# Patient Record
Sex: MALE | Race: WHITE | NOT HISPANIC OR LATINO | Employment: UNEMPLOYED | URBAN - METROPOLITAN AREA
[De-identification: names, ages, dates, MRNs, and addresses within clinical notes are randomized per-mention and may not be internally consistent; named-entity substitution may affect disease eponyms.]

---
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
## 2023-04-23 ENCOUNTER — HEALTH MAINTENANCE LETTER (OUTPATIENT)
Age: 23
End: 2023-04-23

## 2024-08-29 NOTE — PROGRESS NOTES
Problem: Adult Inpatient Plan of Care  Goal: Plan of Care Review  Outcome: Progressing  Goal: Patient-Specific Goal (Individualized)  Outcome: Progressing  Goal: Absence of Hospital-Acquired Illness or Injury  Outcome: Progressing  Goal: Optimal Comfort and Wellbeing  Outcome: Progressing     Problem: Fall Injury Risk  Goal: Absence of Fall and Fall-Related Injury  Outcome: Progressing  Intervention: Identify and Manage Contributors  Flowsheets (Taken 8/28/2024 2026)  Self-Care Promotion:   independence encouraged   BADL personal objects within reach   BADL personal routines maintained   adaptive equipment use encouraged  Medication Review/Management: medications reviewed  Intervention: Promote Injury-Free Environment  Flowsheets (Taken 8/28/2024 2026)  Safety Promotion/Fall Prevention:   assistive device/personal item within reach   commode/urinal/bedpan at bedside   bed alarm set   diversional activities provided   instructed to call staff for mobility   lighting adjusted   medications reviewed   muscle strengthening facilitated   nonskid shoes/socks when out of bed   side rails raised x 3        SUBJECTIVE:                                                    Guero Forrest is a 16 year old male, here for a routine health maintenance visit,   accompanied by his mother in [facility]    Patient was roomed by: Sammi Thorpe MA March 16, 201710:13 AM    Do you have any forms to be completed?  no    SOCIAL HISTORY  Family members in house: mother, father and brother  Language(s) spoken at home: English  Recent family changes/social stressors: none noted    SAFETY/HEALTH RISKS  TB exposure:  No  Cardiac risk assessment: none  Do you monitor your child's screen use?  Yes    VISION:  Testing not done; patient has seen eye doctor in the past 12 months.    HEARING:  Testing not done, normal hearing test last year, no current hearing concerns.    DENTAL  Dental health HIGH risk factors: none  Water source:  city water    SPORTS QUESTIONNAIRE:  ======================   School: [employer]                          Grade: 8th                   Sports: unknown/all  1. no - Has a doctor ever denied or restricted your participation in sports for any reason or told you to give up sports?  2. no - Do you have an ongoing medical condition (like diabetes,asthma, anemia, infections)?   3. no - Are you currently taking any prescription or nonprescription (over-the-counter) medicines or pills?    4. no - Do you have allergies to medicines, pollens, foods or stinging insects?    5. no - Have you ever spent the night in a hospital?  6. no - Have you ever had surgery?   7. no - Have you ever passed out or nearly passed out DURING exercise?  8. no - Have you ever passed out or nearly passed out AFTER exercise?  9. no -Have you ever had discomfort, pain, tightness, or pressure in your chest during exercise?  10. no -Does your heart race or skip beats (irregular beats) during exercise?   11. no -Has a doctor ever told you that you have ;high blood pressure, a heart murmur, high cholesterol,a heart infection, Rheumatic  fever, Kawasaki's Disease?  12. no - Has a doctor ever ordered a test for your heart? (example, ECG/EKG, Echocardiogram, stress test)  13. no -Do you ever get lightheaded or feel more short of breath than expected during exercise?   14. no-Have you ever had an unexplained seizure?   15. no - Do you get more tired or short of breath more quickly than your friends during exercise?   16. no - Has any family member or relative  of heart problems or had an unexpected or unexplained sudden death before age 50 (including unexplained drowning, unexplained car accident or sudden infant death syndrome)?  17. no - Does anyone in your family have hypertrophic cardiomyopathy, Marfan Syndrome, arrhythmogenic right ventricular cardiomyopathy, long QT syndrome, short QT syndrome, Brugada syndrome, or catecholaminergic polymorphic ventricular tachycardia?    18. no - Does anyone in your family have a heart problem, pacemaker, or implanted defibrillator?   19. no -Has anyone in your family had unexplained fainting, unexplained seizures, or near drowning?   20. no - Have you ever had an injury, like a sprain, muscle or ligament tear or tendonitis, that caused you to miss a practice or game?   21. no - Have you had any broken or fractured bones, or dislocated joints?   22 no - Have you had an injury that required x-rays, MRI, CT, surgery, injections, therapy, a brace, a cast, or crutches?    23. no - Have you ever had a stress fracture?   24. no - Have you ever been told that you have or have you had an x-ray for neck instability or atlantoaxial instability? (Down syndrome or dwarfism)  25. no - Do you regularly use a brace, orthotics or assistive device?    26. no -Do you have a bone,muscle, or joint injury that bothers you?   27. no- Do any of your joints become painful, swollen, feel warm or look red?   28. no -Do you have any history of juvenile arthritis or connective tissue disease?   29. no - Has a doctor ever told you that  you have asthma or allergies?   30. no - Do you cough, wheeze, have chest tightness, or have difficulty breathing during or after exercise?    31. no - Is there anyone in your family who has asthma?    32. no - Have you ever used an inhaler or taken asthma medicine?   33. no - Do you develop a rash or hives when you exercise?   34. no - Were you born without or are you missing a kidney, an eye, a testicle (males), or any other organ?  35. no- Do you have groin pain or a painful bulge or hernia in the groin area?   36. no - Have you had infectious mononucleosis (mono) within the last month?   37. no - Do you have any rashes, pressure sores, or other skin problems?   38. no - Have you had a herpes or MRSA skin infection?    39. no - Have you ever had a head injury or concussion?   40. no - Have you ever had a hit or blow in the head that caused confusion, prolonged headaches, or memory problems?    41. no - Do you have a history of seizure disorder?    42. no - Do you have headaches with exercise?   43. no - Have you ever had numbness, tingling or weakness in your arms or legs after being hit or falling?   44. no - Have you ever been unable to move your arms or legs after being hit or falling?   45. no -Have you ever become ill while exercising in the heat?  46. no -Do you get frequent muscle cramps when exercising?  47. no - Do you or someone in your family have sickle cell trait or disease?    48. no - Have you had any problems with your eyes or vision?   49. no - Have you had any eye injuries?   50. no - Do you wear glasses or contact lenses?    51. no - Do you wear protective eyewear, such as goggles or a face shield?  52. no- Do you worry about your weight?    53. no - Are you trying to or has anyone recommended that you gain or lose weight?    54. no- Are you on a special diet or do you avoid certain types of foods?  55. no- Have you ever had an eating disorder?   56. no - Do you have any concerns that you would  like to discuss with a doctor?      QUESTIONS/CONCERNS: None    SAFETY  Car seat belt always worn:  Yes  Helmet worn for bicycle/roller blades/skateboard?  No  Guns/firearms in the home: YES, Trigger locks present? YES, Ammunition separate from firearm: YES    ELECTRONIC MEDIA  TV in bedroom: No  < 2 hours/ day    EDUCATION  School:  [employer]  Grade: 8th  School performance / Academic skills: doing well in school  Days of school missed: 5 or fewer  Concerns: no    ACTIVITIES  Do you get at least 60 minutes per day of physical activity, including time in and out of school: Yes  Extra-curricular activities: bike, hang out with Friends  Organized / team sports:  [employer]    DIET  Do you get at least 4 helpings of a fruit or vegetable every day: Yes  How many servings of juice, non-diet soda, punch or sports drinks per day: orange juice  Dairy:  Milk 1-2x/day, no yogurt    SLEEP  No concerns, sleeps well through night    ============================================================    PROBLEM LIST  Patient Active Problem List   Diagnosis     NO ACTIVE PROBLEMS     Clavicle fracture - left     Osgood-Schlatter's disease     MEDICATIONS  Current Outpatient Prescriptions   Medication Sig Dispense Refill     IBUPROFEN         ALLERGY  Allergies   Allergen Reactions     Nkda [No Known Drug Allergies]        IMMUNIZATIONS  Immunization History   Administered Date(s) Administered     DTAP (<7y) 2002, 2002, 03/07/2003, 01/16/2004, 10/13/2006     HIB 2002, 2002, 03/07/2003, 01/16/2004     Hepatitis A Vac Ped/Adol-2 Dose 09/05/2007, 11/12/2008     Hepatitis B 2002, 03/07/2003, 06/06/2003     Human Papilloma Virus 06/16/2014, 01/26/2015     IPV 10/13/2006     Influenza (IIV3) 10/25/2008     MMR 10/24/2003, 10/13/2006     Meningococcal (Menactra ) 06/16/2014     Pneumococcal (PCV 7) 2002, 2002, 03/07/2003, 09/10/2004     Polio Not Indicated-by Titer 2002, 2002,  "06/06/2003     TDAP (BOOSTRIX AGES 10-64) 06/07/2013     Varicella 10/24/2003, 10/13/2006       HEALTH HISTORY SINCE LAST VISIT  No surgery, major illness or injury since last physical exam    DRUGS  Smoking:  no  Passive smoke exposure:  no  Alcohol:  no  Drugs:  no    SEXUALITY  Sexual activity: No    PSYCHO-SOCIAL/DEPRESSION  General screening:  Pediatric Symptom Checklist-Youth PASS (score 6--<30 pass), no followup necessary  No concerns    ROS  GENERAL: See health history, nutrition and daily activities   SKIN: No  rash, hives or significant lesions  HEENT: Hearing/vision: see above.  No eye, nasal, ear symptoms.  RESP: No cough or other concerns  CV: No concerns  GI: See nutrition and elimination.  No concerns.  : See elimination. No concerns  NEURO: No headaches or concerns.    OBJECTIVE:                                                    EXAM  /75  Pulse 102  Temp 97.4  F (36.3  C) (Oral)  Resp 20  Ht 5' 9.29\" (1.76 m)  Wt 147 lb (66.7 kg)  SpO2 100%  BMI 21.53 kg/m2  87 %ile based on CDC 2-20 Years stature-for-age data using vitals from 3/16/2017.  86 %ile based on CDC 2-20 Years weight-for-age data using vitals from 3/16/2017.  75 %ile based on CDC 2-20 Years BMI-for-age data using vitals from 3/16/2017.  Blood pressure percentiles are 83.7 % systolic and 79.7 % diastolic based on NHBPEP's 4th Report.   GENERAL: Active, alert, in no acute distress.  SKIN: Clear. No significant rash, abnormal pigmentation or lesions  HEAD: Normocephalic  EYES: Pupils equal, round, reactive, Extraocular muscles intact. Normal conjunctivae.  EARS: Normal canals. Tympanic membranes are normal; gray and translucent.  NOSE: Normal without discharge.  MOUTH/THROAT: Clear. No oral lesions. Teeth without obvious abnormalities.  NECK: Supple, no masses.  No thyromegaly.  LYMPH NODES: No adenopathy  LUNGS: Clear. No rales, rhonchi, wheezing or retractions  HEART: Regular rhythm. Normal S1/S2. No murmurs. Normal " pulses.  ABDOMEN: Soft, non-tender, not distended, no masses or hepatosplenomegaly. Bowel sounds normal.   NEUROLOGIC: No focal findings. Cranial nerves grossly intact: DTR's normal. Normal gait, strength and tone  BACK: Spine is straight, no scoliosis.  EXTREMITIES: Full range of motion, no deformities  -M: Normal male external genitalia. Duran stage 3,  both testes descended, no hernia.    SPORTS EXAM:        Shoulder:  normal    Elbow:  normal    Hand/Wrist:  normal    Back:  normal    Quad/Ham:  normal    Knee:  normal    Ankle/Feet:  normal    Heel/Toe:  normal    Duck walk:  normal    ASSESSMENT/PLAN:                                                    1. Encounter for routine child health examination w/o abnormal findings    - BEHAVIORAL / EMOTIONAL ASSESSMENT [58077]    Anticipatory Guidance  The following topics were discussed:  SOCIAL/ FAMILY:    Peer pressure    Increased responsibility    Parent/ teen communication    School/ homework  NUTRITION:    Healthy food choices    Calcium  HEALTH/ SAFETY:    Adequate sleep/ exercise    Body image    Seat belts    Swim/ water safety    Sunscreen/ insect repellent  SEXUALITY:    Dating/ relationships    Preventive Care Plan  Immunizations    Reviewed, up to date  Referrals/Ongoing Specialty care: No   See other orders in [facility].  Cleared for sports:  Yes  BMI at 75 %ile based on CDC 2-20 Years BMI-for-age data using vitals from 3/16/2017.  No weight concerns.  Dental visit recommended: Yes    FOLLOW-UP: in 1-2 year for a Preventive Care visit    Resources  HPV and Cancer Prevention:  What Parents Should Know  What Kids Should Know About HPV and Cancer  Goal Tracker: Be More Active  Goal Tracker: Less Screen Time  Goal Tracker: Drink More Water  Goal Tracker: Eat More Fruits and Veggies    Krystal Dominguez PA-C  [facility]

## 2025-06-19 ENCOUNTER — OFFICE VISIT (OUTPATIENT)
Dept: URGENT CARE | Facility: URGENT CARE | Age: 25
End: 2025-06-19
Payer: COMMERCIAL

## 2025-06-19 VITALS
BODY MASS INDEX: 29.68 KG/M2 | SYSTOLIC BLOOD PRESSURE: 122 MMHG | TEMPERATURE: 98.4 F | OXYGEN SATURATION: 99 % | WEIGHT: 212 LBS | HEART RATE: 95 BPM | HEIGHT: 71 IN | DIASTOLIC BLOOD PRESSURE: 80 MMHG | RESPIRATION RATE: 18 BRPM

## 2025-06-19 DIAGNOSIS — R07.0 THROAT PAIN: Primary | ICD-10-CM

## 2025-06-19 DIAGNOSIS — B34.9 VIRAL SYNDROME: ICD-10-CM

## 2025-06-19 LAB
DEPRECATED S PYO AG THROAT QL EIA: NEGATIVE
S PYO DNA THROAT QL NAA+PROBE: NOT DETECTED

## 2025-06-19 NOTE — PROGRESS NOTES
Urgent Care Clinic Visit    Chief Complaint   Patient presents with    Urgent Care    Throat Problem     Patient reports symptoms started a couple of days ago waking up feeling like he was not able to breath states on the past 4 years he has had a couple of episodes of swollen tonsils, throat pain, swollen lymph  nodes did see ENT in [place] and was given steroids but still having episodes                6/19/2025     2:41 PM   Additional Questions   Roomed by ENRIQUE Lr   Accompanied by Self

## 2025-06-19 NOTE — PROGRESS NOTES
Assessment & Plan     Throat pain    - Streptococcus A Rapid Screen w/Reflex to PCR - Clinic Collect  - Group A Streptococcus PCR Throat Swab    Viral syndrome       Reviewed negative rapid strep results during visit, PCR testing in process, will notify if positive. COVID testing declined. Discussed symptoms likely viral in nature and antibiotic not indicated at this time. Recommended rest, fluids, tylenol and as needed, gargle warm salt water, throat lozenges. Go to ED if difficulty breathing or swallowing. Lidocaine rx declined.     Follow-up with PCP if symptoms persist for 7 days, and sooner if symptoms worsen or new symptoms develop.     Discussed red flag symptoms which warrant immediate visit in emergency room    All questions were answered and patient verbalized understanding. AVS sent via [employer] Winston, MICHAEL, APRN, CNP 6/19/2025 2:56 PM  [facility]    Farhad Jack is a 24 year old male who presents to clinic today for the following health issues:  Chief Complaint   Patient presents with    Urgent Care    Throat Problem     Patient reports symptoms started a couple of days ago waking up feeling like he was not able to breath states on the past 4 years he has had a couple of episodes of swollen tonsils, throat pain, swollen lymph  nodes did see ENT in [place] and was given steroids but still having episodes          6/19/2025     2:41 PM   Additional Questions   Roomed by ENRIQUE Lr   Accompanied by Self         Patient presents for evaluation of sore throat. Associated symptoms: headache, mild nasal congestion. Symptoms started 3-4 days ago and have been worsening. Pain is moderate.   Denies fever, cough, emesis, ear pain, rash. Has been Taking Chloraseptic cough drops which helps temporarily. Has been able to drink fluids and swallow without difficulty.     Patient initially states throat feels narrow making breathing difficult and when advised to go to emergency room  "with difficulty breathing he retracts his statement and denies difficulty swallowing or breathing.     He just came back from vacation in [place] 2 days ago on 5 day vacation. He states he gets similar symptoms previously and antibiotic steroids have helped  him previously. Has a history of mono.     Problem list, Medication list, Allergies, and Medical history reviewed in EP[facility].    ROS:  Review of systems negative except for noted above        Objective    /80   Pulse 95   Temp 98.4  F (36.9  C) (Tympanic)   Resp 18   Ht 1.803 m (5' 11\")   Wt 96.2 kg (212 lb)   SpO2 99%   BMI 29.57 kg/m    Physical Exam  Constitutional:       General: He is not in acute distress.     Appearance: He is not toxic-appearing or diaphoretic.   HENT:      Head: Normocephalic and atraumatic.      Right Ear: Tympanic membrane, ear canal and external ear normal.      Left Ear: Tympanic membrane, ear canal and external ear normal.      Nose: Congestion present.      Mouth/Throat:      Mouth: Mucous membranes are moist.      Pharynx: Oropharynx is clear. Posterior oropharyngeal erythema present. No oropharyngeal exudate.      Tonsils: No tonsillar exudate or tonsillar abscesses. 1+ on the right. 1+ on the left.      Comments: Moderate oropharyngeal erythema  Cardiovascular:      Rate and Rhythm: Normal rate and regular rhythm.      Heart sounds: Normal heart sounds.   Pulmonary:      Effort: Pulmonary effort is normal. No respiratory distress.      Breath sounds: Normal breath sounds. No wheezing, rhonchi or rales.   Lymphadenopathy:      Cervical: Cervical adenopathy present.   Neurological:      Mental Status: He is alert.          Labs:  Results for orders placed or performed in visit on 06/19/25   Streptococcus A Rapid Screen w/Reflex to PCR - Clinic Collect     Status: Normal    Specimen: Throat; Swab   Result Value Ref Range    Group A Strep antigen Negative Negative         "

## 2025-06-22 ENCOUNTER — HEALTH MAINTENANCE LETTER (OUTPATIENT)
Age: 25
End: 2025-06-22